# Patient Record
Sex: MALE | Race: WHITE | Employment: OTHER | URBAN - METROPOLITAN AREA
[De-identification: names, ages, dates, MRNs, and addresses within clinical notes are randomized per-mention and may not be internally consistent; named-entity substitution may affect disease eponyms.]

---

## 2020-07-11 ENCOUNTER — NEW PATIENT COMPREHENSIVE (OUTPATIENT)
Dept: URBAN - METROPOLITAN AREA CLINIC 46 | Facility: CLINIC | Age: 65
End: 2020-07-11

## 2020-07-11 DIAGNOSIS — H52.7: ICD-10-CM

## 2020-07-11 PROCEDURE — 92004 COMPRE OPH EXAM NEW PT 1/>: CPT

## 2020-07-11 PROCEDURE — 92015 DETERMINE REFRACTIVE STATE: CPT

## 2020-07-11 ASSESSMENT — VISUAL ACUITY
OS_SC: 20/70-1
OD_SC: J1
OD_CC: J1+
OS_SC: J1+
OS_CC: 20/20
OS_CC: J1+
OD_CC: 20/20-1
OD_SC: 20/400

## 2020-07-11 ASSESSMENT — TONOMETRY
OS_IOP_MMHG: 14
OD_IOP_MMHG: 14

## 2021-07-14 ENCOUNTER — ESTABLISHED COMPREHENSIVE EXAM (OUTPATIENT)
Dept: URBAN - METROPOLITAN AREA CLINIC 46 | Facility: CLINIC | Age: 66
End: 2021-07-14

## 2021-07-14 DIAGNOSIS — H52.7: ICD-10-CM

## 2021-07-14 PROCEDURE — 92014 COMPRE OPH EXAM EST PT 1/>: CPT

## 2021-07-14 PROCEDURE — 92015 DETERMINE REFRACTIVE STATE: CPT

## 2021-07-14 ASSESSMENT — VISUAL ACUITY
OS_CC: 20/20
OS_CC: J1+
OD_CC: 20/25-1
OD_CC: J1
OD_SC: J1+
OS_SC: 20/200
OD_SC: 20/400
OS_SC: J1+

## 2021-07-14 ASSESSMENT — TONOMETRY
OS_IOP_MMHG: 18
OD_IOP_MMHG: 17

## 2022-07-20 ENCOUNTER — COMPREHENSIVE EXAM (OUTPATIENT)
Dept: URBAN - METROPOLITAN AREA CLINIC 46 | Facility: CLINIC | Age: 67
End: 2022-07-20

## 2022-07-20 DIAGNOSIS — H52.7: ICD-10-CM

## 2022-07-20 PROCEDURE — 92015 DETERMINE REFRACTIVE STATE: CPT

## 2022-07-20 PROCEDURE — 92014 COMPRE OPH EXAM EST PT 1/>: CPT

## 2022-07-20 ASSESSMENT — VISUAL ACUITY
OD_SC: J1+
OS_CC: 20/20-2
OS_CC: J1+
OS_SC: J1+
OS_SC: 20/200
OD_SC: 20/200
OD_CC: J1
OD_CC: 20/20-1

## 2022-07-20 ASSESSMENT — TONOMETRY
OS_IOP_MMHG: 16
OD_IOP_MMHG: 15

## 2022-12-07 ENCOUNTER — ESTABLISHED PATIENT (OUTPATIENT)
Dept: URBAN - METROPOLITAN AREA CLINIC 46 | Facility: CLINIC | Age: 67
End: 2022-12-07

## 2022-12-07 PROCEDURE — 92012 INTRM OPH EXAM EST PATIENT: CPT

## 2022-12-07 ASSESSMENT — VISUAL ACUITY
OS_BAT: 20/60
OD_BAT: 20/60
OS_CC: J2
OD_CC: 20/25
OD_CC: J2
OS_CC: 20/25

## 2022-12-07 ASSESSMENT — TONOMETRY
OS_IOP_MMHG: 15
OD_IOP_MMHG: 14

## 2023-01-03 ENCOUNTER — CONSULTATION/EVALUATION (OUTPATIENT)
Dept: URBAN - METROPOLITAN AREA CLINIC 43 | Facility: CLINIC | Age: 68
End: 2023-01-03

## 2023-01-03 DIAGNOSIS — H25.813: ICD-10-CM

## 2023-01-03 DIAGNOSIS — H18.832: ICD-10-CM

## 2023-01-03 PROCEDURE — 92134 CPTRZ OPH DX IMG PST SGM RTA: CPT

## 2023-01-03 PROCEDURE — 92025-1 CORNEAL TOPOGRAPHY, INS

## 2023-01-03 PROCEDURE — 92250 FUNDUS PHOTOGRAPHY W/I&R: CPT

## 2023-01-03 PROCEDURE — V2799PMN IMPRIMIS PRED-MOXI-NEPAF 5ML

## 2023-01-03 PROCEDURE — 92286 ANT SGM IMG I&R SPECLR MIC: CPT

## 2023-01-03 PROCEDURE — 92136TC INTERFEROMETRY - TECHNICAL COMPONENT

## 2023-01-03 PROCEDURE — 99214 OFFICE O/P EST MOD 30 MIN: CPT

## 2023-01-03 ASSESSMENT — VISUAL ACUITY
OS_CC: 20/20-1
OS_CC: J2
OD_CC: 20/30-2
OS_SC: J1+
OD_CC: J3
OD_BAT: 20/60
OD_SC: J1
OD_SC: 20/200
OS_BAT: 20/60
OS_SC: 20/100-1

## 2023-01-03 ASSESSMENT — TONOMETRY
OD_IOP_MMHG: 12
OS_IOP_MMHG: 12

## 2023-01-26 ENCOUNTER — SURGERY/PROCEDURE (OUTPATIENT)
Dept: URBAN - METROPOLITAN AREA CLINIC 43 | Facility: CLINIC | Age: 68
End: 2023-01-26

## 2023-01-26 ENCOUNTER — PRE-OP/H&P (OUTPATIENT)
Dept: URBAN - METROPOLITAN AREA SURGERY 14 | Facility: SURGERY | Age: 68
End: 2023-01-26

## 2023-01-26 DIAGNOSIS — H25.813: ICD-10-CM

## 2023-01-26 PROCEDURE — 66984 XCAPSL CTRC RMVL W/O ECP: CPT

## 2023-01-26 PROCEDURE — 99211T TECH SERVICE

## 2023-01-27 ENCOUNTER — POST-OP (OUTPATIENT)
Dept: URBAN - METROPOLITAN AREA CLINIC 46 | Facility: CLINIC | Age: 68
End: 2023-01-27

## 2023-01-27 DIAGNOSIS — Z96.1: ICD-10-CM

## 2023-01-27 PROCEDURE — 99024 POSTOP FOLLOW-UP VISIT: CPT

## 2023-01-27 ASSESSMENT — TONOMETRY
OS_IOP_MMHG: 13
OD_IOP_MMHG: 13

## 2023-01-28 ASSESSMENT — VISUAL ACUITY
OD_SC: 20/40
OS_PH: 20/30
OS_SC: J3
OD_SC: J12
OS_CC: 20/40
OD_PH: 20/30

## 2023-01-31 ENCOUNTER — POST OP/EVAL OF SECOND EYE (OUTPATIENT)
Dept: URBAN - METROPOLITAN AREA CLINIC 46 | Facility: CLINIC | Age: 68
End: 2023-01-31

## 2023-01-31 DIAGNOSIS — H17.9: ICD-10-CM

## 2023-01-31 DIAGNOSIS — H01.003: ICD-10-CM

## 2023-01-31 DIAGNOSIS — H25.812: ICD-10-CM

## 2023-01-31 DIAGNOSIS — H01.006: ICD-10-CM

## 2023-01-31 DIAGNOSIS — Z96.1: ICD-10-CM

## 2023-01-31 PROCEDURE — 99213 OFFICE O/P EST LOW 20 MIN: CPT

## 2023-01-31 PROCEDURE — 99024 POSTOP FOLLOW-UP VISIT: CPT

## 2023-01-31 ASSESSMENT — TONOMETRY
OD_IOP_MMHG: 12
OS_IOP_MMHG: 14

## 2023-01-31 ASSESSMENT — VISUAL ACUITY
OD_SC: J8
OS_SC: J3
OS_BAT: 20/>400
OS_SC: 20/60
OD_SC: 20/25

## 2023-08-15 ENCOUNTER — PREPPED CHART (OUTPATIENT)
Dept: URBAN - METROPOLITAN AREA CLINIC 43 | Facility: CLINIC | Age: 68
End: 2023-08-15

## 2023-09-19 ENCOUNTER — COMPREHENSIVE EXAM (OUTPATIENT)
Dept: URBAN - METROPOLITAN AREA CLINIC 43 | Facility: CLINIC | Age: 68
End: 2023-09-19

## 2023-09-19 DIAGNOSIS — H04.123: ICD-10-CM

## 2023-09-19 DIAGNOSIS — H52.7: ICD-10-CM

## 2023-09-19 DIAGNOSIS — H26.493: ICD-10-CM

## 2023-09-19 PROCEDURE — 92014 COMPRE OPH EXAM EST PT 1/>: CPT

## 2023-09-19 PROCEDURE — 92015 DETERMINE REFRACTIVE STATE: CPT

## 2023-09-19 ASSESSMENT — VISUAL ACUITY
OU_SC: 20/20
OS_CC: J2
OD_SC: 20/20
OU_CC: J1
OS_SC: J12
OD_CC: J3
OD_SC: J12
OU_SC: J10
OS_SC: 20/20-2

## 2023-09-19 ASSESSMENT — TONOMETRY
OD_IOP_MMHG: 11
OS_IOP_MMHG: 11

## 2024-01-16 ENCOUNTER — APPOINTMENT (RX ONLY)
Dept: URBAN - METROPOLITAN AREA CLINIC 135 | Facility: CLINIC | Age: 69
Setting detail: DERMATOLOGY
End: 2024-01-16

## 2024-01-16 DIAGNOSIS — L81.4 OTHER MELANIN HYPERPIGMENTATION: ICD-10-CM

## 2024-01-16 DIAGNOSIS — L82.1 OTHER SEBORRHEIC KERATOSIS: ICD-10-CM

## 2024-01-16 DIAGNOSIS — D18.0 HEMANGIOMA: ICD-10-CM

## 2024-01-16 DIAGNOSIS — L57.0 ACTINIC KERATOSIS: ICD-10-CM

## 2024-01-16 DIAGNOSIS — D49.2 NEOPLASM OF UNSPECIFIED BEHAVIOR OF BONE, SOFT TISSUE, AND SKIN: ICD-10-CM

## 2024-01-16 DIAGNOSIS — D17 BENIGN LIPOMATOUS NEOPLASM: ICD-10-CM

## 2024-01-16 DIAGNOSIS — L57.8 OTHER SKIN CHANGES DUE TO CHRONIC EXPOSURE TO NONIONIZING RADIATION: ICD-10-CM

## 2024-01-16 PROBLEM — D17.21 BENIGN LIPOMATOUS NEOPLASM OF SKIN AND SUBCUTANEOUS TISSUE OF RIGHT ARM: Status: ACTIVE | Noted: 2024-01-16

## 2024-01-16 PROBLEM — D18.01 HEMANGIOMA OF SKIN AND SUBCUTANEOUS TISSUE: Status: ACTIVE | Noted: 2024-01-16

## 2024-01-16 PROCEDURE — ? LIQUID NITROGEN

## 2024-01-16 PROCEDURE — 17003 DESTRUCT PREMALG LES 2-14: CPT

## 2024-01-16 PROCEDURE — 99203 OFFICE O/P NEW LOW 30 MIN: CPT | Mod: 25

## 2024-01-16 PROCEDURE — ? BIOPSY BY SHAVE METHOD

## 2024-01-16 PROCEDURE — 11102 TANGNTL BX SKIN SINGLE LES: CPT

## 2024-01-16 PROCEDURE — ? COUNSELING

## 2024-01-16 PROCEDURE — 17000 DESTRUCT PREMALG LESION: CPT | Mod: 59

## 2024-01-16 ASSESSMENT — LOCATION DETAILED DESCRIPTION DERM
LOCATION DETAILED: LEFT ANTERIOR PROXIMAL UPPER ARM
LOCATION DETAILED: LEFT SUPERIOR UPPER BACK
LOCATION DETAILED: LEFT ANTERIOR DISTAL UPPER ARM
LOCATION DETAILED: PERIUMBILICAL SKIN
LOCATION DETAILED: RIGHT ANTERIOR PROXIMAL UPPER ARM
LOCATION DETAILED: RIGHT DISTAL DORSAL FOREARM
LOCATION DETAILED: EPIGASTRIC SKIN
LOCATION DETAILED: RIGHT MEDIAL FRONTAL SCALP
LOCATION DETAILED: XIPHOID
LOCATION DETAILED: RIGHT ANTERIOR DISTAL UPPER ARM
LOCATION DETAILED: INFERIOR THORACIC SPINE
LOCATION DETAILED: LEFT SUPERIOR LATERAL UPPER BACK
LOCATION DETAILED: LEFT ANTERIOR LATERAL PROXIMAL UPPER ARM

## 2024-01-16 ASSESSMENT — LOCATION ZONE DERM
LOCATION ZONE: TRUNK
LOCATION ZONE: SCALP
LOCATION ZONE: ARM

## 2024-01-16 ASSESSMENT — LOCATION SIMPLE DESCRIPTION DERM
LOCATION SIMPLE: LEFT UPPER ARM
LOCATION SIMPLE: RIGHT FOREARM
LOCATION SIMPLE: ABDOMEN
LOCATION SIMPLE: RIGHT UPPER ARM
LOCATION SIMPLE: LEFT UPPER BACK
LOCATION SIMPLE: RIGHT SCALP
LOCATION SIMPLE: UPPER BACK

## 2024-01-16 NOTE — PROCEDURE: COUNSELING
Sunscreen Recommendations: Discussed sunblock should be applied to exposed areas daily, and be reapplied every two hours while exposed . The sunblock should be broad spectrum SPF-50, UVA/UVB and contain Zinc and Titanium Dioxide(Blue Lizard & Sun Bum are some good OTC brands). Strongly recommend [employer]ta MD products. Recommend sun protective clothing such as long sleeve UPF protective shirts, wide brim hats, neck covers and sunglasses as it has been proven to prevent further photo damage from the sun.
Detail Level: Zone
Detail Level: Detailed
Detail Level: Generalized

## 2024-01-16 NOTE — PROCEDURE: MIPS QUALITY
Quality 226: Preventive Care And Screening: Tobacco Use: Screening And Cessation Intervention: Patient screened for tobacco use and is an ex/non-smoker
Additional Notes: Consuming alcohol can impact recovery of any treatments .
Quality 130: Documentation Of Current Medications In The Medical Record: Current Medications Documented
Quality 47: Advance Care Plan: Advance Care Planning discussed and documented; advance care plan or surrogate decision maker documented in the medical record.
Detail Level: Detailed
Quality 431: Preventive Care And Screening: Unhealthy Alcohol Use - Screening: Patient identified as an unhealthy alcohol user when screened for unhealthy alcohol use using a systematic screening method and received brief counseling

## 2024-01-16 NOTE — PROCEDURE: BIOPSY BY SHAVE METHOD
Detail Level: Detailed
Depth Of Biopsy: dermis
Was A Bandage Applied: Yes
Size Of Lesion In Cm: 0.9
X Size Of Lesion In Cm: 0
Biopsy Type: H and E
Biopsy Method: Dermablade
Anesthesia Type: 1% lidocaine with epinephrine
Anesthesia Volume In Cc: 0.5
Hemostasis: Drysol
Wound Care: Petrolatum
Dressing: bandage
Destruction After The Procedure: No
Type Of Destruction Used: Curettage
Curettage Text: The wound bed was treated with curettage after the biopsy was performed.
Cryotherapy Text: The wound bed was treated with cryotherapy after the biopsy was performed.
Electrodesiccation Text: The wound bed was treated with electrodesiccation after the biopsy was performed.
Electrodesiccation And Curettage Text: The wound bed was treated with electrodesiccation and curettage after the biopsy was performed.
Silver Nitrate Text: The wound bed was treated with silver nitrate after the biopsy was performed.
Lab: [id number]
Lab Facility: [id number]
Consent: Written consent was obtained and risks were reviewed including but not limited to scarring, infection, bleeding, scabbing, incomplete removal, nerve damage and allergy to anesthesia.
Post-Care Instructions: I reviewed with the patient in detail post-care instructions. Patient is to keep the biopsy site dry overnight, and then apply bacitracin twice daily until healed. Patient may apply hydrogen peroxide soaks to remove any crusting.
Notification Instructions: Patient will be notified of biopsy results. However, patient instructed to call the office if not contacted within 2 weeks.
Billing Type: Third-Party Bill
Information: Selecting Yes will display possible errors in your note based on the variables you have selected. This validation is only offered as a suggestion for you. PLEASE NOTE THAT THE VALIDATION TEXT WILL BE REMOVED WHEN YOU FINALIZE YOUR NOTE. IF YOU WANT TO FAX A PRELIMINARY NOTE YOU WILL NEED TO TOGGLE THIS TO 'NO' IF YOU DO NOT WANT IT IN YOUR FAXED NOTE.

## 2025-08-18 ENCOUNTER — COMPREHENSIVE EXAM (OUTPATIENT)
Age: 70
End: 2025-08-18

## 2025-08-18 DIAGNOSIS — H04.123: ICD-10-CM

## 2025-08-18 DIAGNOSIS — H17.9: ICD-10-CM

## 2025-08-18 DIAGNOSIS — H01.006: ICD-10-CM

## 2025-08-18 DIAGNOSIS — H26.493: ICD-10-CM

## 2025-08-18 DIAGNOSIS — H01.003: ICD-10-CM

## 2025-08-18 PROCEDURE — 92014 COMPRE OPH EXAM EST PT 1/>: CPT
